# Patient Record
(demographics unavailable — no encounter records)

---

## 2025-04-14 NOTE — HISTORY OF PRESENT ILLNESS
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 this was his 3rd dislocation, 1st even approx 2021 and did extensive PT but cont to have instbaility ever since The pain is located anterior, lateral  The pain is associated with clicking, popping, instability Worse with activity and better at rest. Has tried ice, ibuprofen, sling , PT, activity mod

## 2025-04-14 NOTE — IMAGING
[de-identified] :  RIGHT SHOULDER Inspection: No swelling.  Palpation: Tenderness is noted at the anterior shoulder.  Range of motion:  Full range of motion but with some pain. Strength: There is pain and discomfort with strength testing.  Neurological testing: motor and sensor intact distally. Ligament Stability and Special Tests:  Shoulder apprehension: pos Shoulder relocation: pos Obriens test: pos Biceps Active test: pos Lara Labral Shear: pos Impingement testing: neg Katelin testing: pain Whipple: neg Cross Body Adduction: neg

## 2025-04-14 NOTE — ASSESSMENT
[FreeTextEntry1] :  Imaging was reviewed and independently interpreted [facility] show anterior dislociaiton  Right X-Ray Examination of the SHOULDER 2 views:  no fractures, subluxations or dislocations. X-Ray Examination of the SCAPULA 1 or 2 views shows: no significant abnormalities   Acute complicated injury with probable labral tear that may require surgery  Due to the patients instability event along with exam consistent with labral tear we will get an mri to eval integrity of labrum   - We discussed their diagnosis and treatment options at length including the risks and benefits of both surgical and non-surgical options. Surgical risks include but are not limited to pain, infection, bleeding, vascular injury, numbness, tingling, nerve damage. - The patient was advised to apply ice (wrapped in a towel or protective covering) to the area daily (20 minutes at a time, 2-4X/day). - diclofenac rx - Patient was given a prescription for an anti-inflammatory medication.  They will take it for the next week and then on an as needed basis, as long as there are no medical contra-indications.  Patient is counseled on possible GI, renal, and cardiovascular side effects. - The patient was advised to modify their activities. - f/u after mri to further discuss surgery

## 2025-04-21 NOTE — IMAGING
[de-identified] :  RIGHT SHOULDER Inspection: No swelling.  Palpation: Tenderness is noted at the anterior shoulder.  Range of motion:  Full range of motion but with some pain. Strength: There is pain and discomfort with strength testing.  Neurological testing: motor and sensor intact distally. Ligament Stability and Special Tests:  Shoulder apprehension: pos Shoulder relocation: pos Obriens test: pos Biceps Active test: pos Lara Labral Shear: pos Impingement testing: neg Katelin testing: pain Whipple: neg Cross Body Adduction: neg

## 2025-04-21 NOTE — DISCUSSION/SUMMARY
[de-identified] : The patient was advised of the diagnosis. The natural history of the pathology was explained in full to the patient in layman's terms. Several different treatment options were discussed and explained to the patient including the risks and benefits of both surgical and non-surgical treatments.   I do think they are a candidate for surgery, given their instability and failure to improve with previous non-operative treatment in order to stabilize their shoulder, decrease their risk of another dislocation event, and prevent further chondral damage and the long term development of arthritis. We discussed that the goal of surgery is pain relief along with improved function, stability and motion.  The risks, benefits, and alternatives to surgical arthroscopy right shoulder with capsulorrhaphy and labral repair were reviewed with the patient. Risks of surgery were outlined in full to the patient including but not limited to pain, infection (superficial or deep), bleeding, vascular injury, numbness, tingling, nerve damage (direct or indirect), scarring, stiffness, non-healing, wound breakdown, failure to resolve symptoms, symptom recurrence, the need for further surgery, as well as medical complications such as blood clots, pulmonary embolism, heart attack, stroke, and other anesthesia complications including even death. The patient understood and accepted these risks and that other complications not mentioned above could occur.  They understands that 100% recovery is not assured and the desired level of function may not be achievable. We discussed the potential for prolonged recovery course and the potential for this to affect their activities, which could include sports/work regimen.  The intraoperative plan, post-operative plan, post-operative expectations and limitations were explained in full. The importance of postoperative rehabilitation and restriction compliance was emphasized. Expectations from non-surgical treatment were explained in full as well. The patient demonstrated a complete understanding of the treatment alternatives and requested to proceed with surgery. This will be scheduled accordingly.

## 2025-04-21 NOTE — ASSESSMENT
[FreeTextEntry1] :  mri right shoulder 4/17/25 - extensive ant labral tearing with medial displacement, slap tear, nondisplacec post-inf tear   - We discussed their diagnosis and treatment options at length including the risks and benefits of both surgical and non-surgical options. Surgical risks include but are not limited to pain, infection, bleeding, vascular injury, numbness, tingling, nerve damage. - Given their prior dislocation / instability events and failure to improve with previous non-operative treatment, they are a candidate for surgery in order to stabilize their shoulder, decrease their risk of another dislocation event, and prevent further chondral damage and the long term development of arthritis. - The pros and cons along with the risks, benefits, and alternatives to right  shoulder surgical arthroscopy with capsulorrhaphy and labral repair were discussed with the patient, all questions were answered.

## 2025-04-21 NOTE — HISTORY OF PRESENT ILLNESS
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 this was his 3rd dislocation, 1st even approx 2021 and did extensive PT but cont to have instbaility ever since The pain is located anterior, lateral  The pain is associated with clicking, popping, instability Worse with activity and better at rest. Has tried ice, ibuprofen, sling , PT, activity mod  4/21/25 - had mri, cont pain and clicking and instability

## 2025-06-02 NOTE — HISTORY OF PRESENT ILLNESS
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 this was his 3rd dislocation, 1st even approx 2021 and did extensive PT but cont to have instbaility ever since The pain is located anterior, lateral  The pain is associated with clicking, popping, instability Worse with activity and better at rest. Has tried ice, ibuprofen, sling , PT, activity mod  4/21/25 - had mri, cont pain and clicking and instability  **s/p R shoulder ant/post labral repair, SLAP repair on 5/21/25***  6/2/25 - po visit, using sling, starting PT at [facility] in

## 2025-06-02 NOTE — HISTORY OF PRESENT ILLNESS
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 this was his 3rd dislocation, 1st even approx 2021 and did extensive PT but cont to have instbaility ever since The pain is located anterior, lateral  The pain is associated with clicking, popping, instability Worse with activity and better at rest. Has tried ice, ibuprofen, sling , PT, activity mod  4/21/25 - had mri, cont pain and clicking and instability  **s/p R shoulder ant/post labral repair, SLAP repair on 5/21/25***  6/2/25 - po visit, using sling, starting PT at [facility] in

## 2025-06-02 NOTE — IMAGING
[de-identified] :  RIGHT  SHOULDER Inspection: incisions c/d/i Palpation: No Tenderness is noted  Range of motion:  in sling Strength:   Neurological testing: motor and sensor intact distally. Ligament Stability and Special Tests:  Shoulder apprehension:  Shoulder relocation:  Obriens test: Biceps Active test: Lara Labral Shear:  Impingement testing:  Katelin testing: Cross Body Adduction:

## 2025-06-02 NOTE — IMAGING
[de-identified] :  RIGHT  SHOULDER Inspection: incisions c/d/i Palpation: No Tenderness is noted  Range of motion:  in sling Strength:   Neurological testing: motor and sensor intact distally. Ligament Stability and Special Tests:  Shoulder apprehension:  Shoulder relocation:  Obriens test: Biceps Active test: Lara Labral Shear:  Impingement testing:  Katelin testing: Cross Body Adduction:

## 2025-06-02 NOTE — ASSESSMENT
[FreeTextEntry1] : s/p R shoulder ant/post labral repair, SLAP repair on 5/21/25  - PT on post op protocol - The patient was provided with a prescription for Physical Therapy  - Home exercises program learned at physical therapy. - The patient was advised to apply ice (wrapped in a towel or protective covering) to the area daily (20 minutes at a time, 2-4X/day). - fu 8 week re-eval

## 2025-07-21 NOTE — IMAGING
[de-identified] :   RIGHT SHOULDER Inspection: well healed surg scars Palpation: No Tenderness is noted  Range of motion:  , ER 55, @90ER 70, @90IR 30 Strength: Forward Flexion 4/5. Abduction 4/5.  External Rotation 4/5 and Internal Rotation 5/5 Neurological testing: motor and sensor intact distally. Ligament Stability and Special Tests:  Shoulder apprehension: neg Shoulder relocation: neg Obriens test: neg Biceps Active test: neg Lara Labral Shear: neg Impingement testing: neg Katelin testing: neg Cross Body Adduction: neg

## 2025-07-21 NOTE — HISTORY OF PRESENT ILLNESS
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 this was his 3rd dislocation, 1st even approx 2021 and did extensive PT but cont to have instbaility ever since The pain is located anterior, lateral  The pain is associated with clicking, popping, instability Worse with activity and better at rest. Has tried ice, ibuprofen, sling , PT, activity mod  4/21/25 - had mri, cont pain and clicking and instability  **s/p R shoulder ant/post labral repair, SLAP repair on 5/21/25***  6/2/25 - po visit, using sling, starting PT at [facility] in  7/21/25 - cont with PT with [facility] a little stiff, working hard

## 2025-07-21 NOTE — IMAGING
[de-identified] :   RIGHT SHOULDER Inspection: well healed surg scars Palpation: No Tenderness is noted  Range of motion:  , ER 55, @90ER 70, @90IR 30 Strength: Forward Flexion 4/5. Abduction 4/5.  External Rotation 4/5 and Internal Rotation 5/5 Neurological testing: motor and sensor intact distally. Ligament Stability and Special Tests:  Shoulder apprehension: neg Shoulder relocation: neg Obriens test: neg Biceps Active test: neg Lara Labral Shear: neg Impingement testing: neg Katelin testing: neg Cross Body Adduction: neg

## 2025-07-21 NOTE — HISTORY OF PRESENT ILLNESS
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 this was his 3rd dislocation, 1st even approx 2021 and did extensive PT but cont to have instbaility ever since The pain is located anterior, lateral  The pain is associated with clicking, popping, instability Worse with activity and better at rest. Has tried ice, ibuprofen, sling , PT, activity mod  4/21/25 - had mri, cont pain and clicking and instability  **s/p R shoulder ant/post labral repair, SLAP repair on 5/21/25***  6/2/25 - po visit, using sling, starting PT at [facility] in  7/21/25 - cont with PT with [facility] a little stiff, working hard

## 2025-07-21 NOTE — ASSESSMENT
[FreeTextEntry1] : s/p R shoulder ant/post labral repair, SLAP repair on 5/21/25   - The patient was advised of the diagnosis.  The natural history of the pathology was explained to the patient in layman's terms.  Several different treatment options were discussed and explained including the risks and benefits.   - They will continue conservative treatment with PT, icing, and anti-inflammatory medications. - cont PT on post op protocol - The patient was provided with a prescription for Physical Therapy  - Home exercises program learned at physical therapy. - fu 6-8 week re-eval